# Patient Record
Sex: MALE | Race: WHITE | NOT HISPANIC OR LATINO | Employment: FULL TIME | URBAN - METROPOLITAN AREA
[De-identification: names, ages, dates, MRNs, and addresses within clinical notes are randomized per-mention and may not be internally consistent; named-entity substitution may affect disease eponyms.]

---

## 2023-10-12 ENCOUNTER — OFFICE VISIT (OUTPATIENT)
Dept: ORTHOPEDIC SURGERY | Facility: CLINIC | Age: 58
End: 2023-10-12
Payer: COMMERCIAL

## 2023-10-12 VITALS — BODY MASS INDEX: 34.72 KG/M2 | WEIGHT: 248 LBS | HEIGHT: 71 IN

## 2023-10-12 DIAGNOSIS — M75.51 BURSITIS OF RIGHT SHOULDER: ICD-10-CM

## 2023-10-12 DIAGNOSIS — S46.111S LABRAL TEAR OF LONG HEAD OF BICEPS TENDON, RIGHT, SEQUELA: Primary | ICD-10-CM

## 2023-10-12 DIAGNOSIS — M75.41 IMPINGEMENT SYNDROME OF RIGHT SHOULDER: ICD-10-CM

## 2023-10-12 DIAGNOSIS — M25.511 RIGHT SHOULDER PAIN, UNSPECIFIED CHRONICITY: ICD-10-CM

## 2023-10-12 DIAGNOSIS — S46.001A ROTATOR CUFF INJURY, RIGHT, INITIAL ENCOUNTER: ICD-10-CM

## 2023-10-12 RX ORDER — VALSARTAN 80 MG/1
1 TABLET ORAL DAILY
COMMUNITY

## 2023-10-12 RX ORDER — ATORVASTATIN CALCIUM 20 MG/1
1 TABLET, FILM COATED ORAL DAILY
COMMUNITY
Start: 2023-09-27

## 2023-10-12 NOTE — PROGRESS NOTES
[facility] Orthopaedic Surgery Office Visit - Brody Munguia MD    Office Visit       Patient Name: Otoniel Ball    Chief Complaint:   Chief Complaint   Patient presents with    Right Shoulder - Pain       Referring Physician: Naun Dos Santos*  - I appreciate the referral      History of Present Illness:   Otoniel Ball is a 58 y.o. male who presents with right body part: shoulder Reason: pain.  Onset:Onset: bicep tear 2007 . The issue has been ongoing for 15 year(s). Pain is a 2/10 on the pain scale. Pain is described as Pain Characterization: throbbing. Associated symptoms include Symptoms: pain. The pain is worse with  throwing motion ; nothing improve the pain. Previous treatments have included: Activity modification, self exercise.  I have reviewed the patient's history of present illness as noted/entered above.    I have reviewed the patient's past medical history, surgical history, social history, family history, medications, and allergies as noted in the electronic medical record and as noted/entered.  I have reviewed the patient's review of systems as noted/enter and updated as noted in the patient's HPI.      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    35 years at [employer]    Patient has tried anti-inflammatories, activity modifications, exercise program -- no relief    Enjoys: traveling, hiking, [place] x10 visits          58 y.o. male  Body mass index is 34.72 kg/mý.    Subjective   Subjective      Review of Systems     Past Medical History:   Past Medical History:   Diagnosis Date    Dislocation of finger 1980    Knee sprain 2000    Low back strain  1999    Lumbosacral disc disease 1999    2 rupted discs    Rotator cuff syndrome 2000    Tear of meniscus of knee 2000    Softball       Past Surgical History:   Past Surgical History:   Procedure Laterality Date    HERNIA REPAIR      20 yrs ago - left testicle, belly Button       Family History:   Family History   Problem Relation Age of Onset    No Known Problems Mother     No Known Problems Father     No Known Problems Sister     No Known Problems Brother     No Known Problems Maternal Aunt     No Known Problems Maternal Uncle     No Known Problems Paternal Aunt     No Known Problems Paternal Uncle     No Known Problems Maternal Grandmother     No Known Problems Maternal Grandfather     No Known Problems Paternal Grandmother     No Known Problems Paternal Grandfather     No Known Problems Other        Social History:   Social History     Socioeconomic History    Marital status:    Tobacco Use    Smoking status: Never    Tobacco comments:     Chewed for a few years.   Vaping Use    Vaping Use: Never used   Substance and Sexual Activity    Alcohol use: Yes     Alcohol/week: 2.0 standard drinks of alcohol     Types: 1 Cans of beer, 1 Drinks containing 0.5 oz of alcohol per week    Drug use: Never    Sexual activity: Yes     Partners: Female     Birth control/protection: I.U.D.       Medications:   Current Outpatient Medications:     atorvastatin (LIPITOR) 20 MG tablet, Take 1 tablet by mouth Daily., Disp: , Rfl:     valsartan (DIOVAN) 80 MG tablet, Take 1 tablet by mouth Daily., Disp: , Rfl:     Allergies:   Allergies   Allergen Reactions    Penicillins Rash     CAUSES RASH       The following portions of the patient's history were reviewed and updated as appropriate: allergies, current medications, past family history, past medical history, past social history, past surgical history and problem list.        Objective    Objective      Vital Signs:   Vitals:    10/12/23 1510   Weight: 112 kg (248 lb)  "  Height: 180 cm (70.87\")       Ortho Exam:  General: no acute distress, comfortable  Vitals reviewed in chart    Musculoskeletal Exam    SIDE: Right shoulder  Shoulder Exam:  Tenderness: Chronic long head of the biceps deformity/subtle Vance deformity    Range of motion measurements (degrees)  Forward flexion/Abduction/External rotation at side/ER at 90/IR at 90/IR position  Active: 150/150/60/80/80  Passive: same    Painful arc of motion: yes  No evidence of septic joint  Pain with forward flexion and abduction greater: 90 degrees  Impingement testing Neer's test - positive/painful  Impingement testing Hawkin's test - positive/painful    Rotator Cuff Testing:  Tenderness to palpation at rotator cuff - no  Rotator cuff testing Edmund's test -painful  Rotator cuff testing External rotation - no  Rotator cuff testing Lag signs - no  Rotator cuff testing Belly press - no  Pain with abduction great than 90 degrees - yes    Scapular dyskinesis - present, abnormal scapular motion    Biceps testing (biceps tenderness to palpation, Positive Hurtado's test for biceps, Positive Speed's test, Positive uppercut test): Long head of the biceps has deformity consistent with long head of the biceps rupture-Vance deformity which is fairly subtle but present.  This matches his clinical history.  Some biceps muscle belly pain      Results Review:   Imaging Results (Last 24 Hours)       Procedure Component Value Units Date/Time    XR Shoulder 2+ View Right [[id number]] Resulted: 10/12/23 1525     Updated: 10/12/23 1526    Narrative:      Imaging: shoulder x-rays 2 views - AP and axillary x-ray views    Side: Right shoulder    Indication for shoulder x-ray 2 views: shoulder pain    Comparison: no comparison views available    Findings: No acute bony pathology. No superior humeral head migration.    The humeral head remains centered in the glenohumeral joint. No evidence   of calcific tendonitis.      I personally reviewed the above " x-rays.            Procedures             Assessment / Plan      Assessment/Plan:   Problem List Items Addressed This Visit          Musculoskeletal and Injuries    Right shoulder pain    Relevant Orders    XR Shoulder 2+ View Right (Completed)    MRI Shoulder Right Without Contrast    Bursitis of right shoulder    Relevant Orders    MRI Shoulder Right Without Contrast    Impingement syndrome of right shoulder    Relevant Orders    MRI Shoulder Right Without Contrast       Other    Labral tear of long head of biceps tendon, right, sequela - Primary    Relevant Orders    MRI Shoulder Right Without Contrast    Rotator cuff injury, right, initial encounter    Relevant Orders    MRI Shoulder Right Without Contrast       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  PDF with patient counseling provided.  Concurrent rotator cuff pathology is possible given his dysfunction.  In light of these findings MRI is warranted to further evaluate the right shoulder.    MRI of the shoulder is recommended.  Indication: suspected rotator cuff tear  The MRI is critical to evaluate for rotator cuff tearing and will help with possible surgical planning.      Follow Up: RIGHT SHOULDER MRI --follow-up after MRI completed        Brody Munguia MD, FAAOS  Orthopedic Surgeon  Fellowship Trained Shoulder and Elbow Surgeon  [facility]  Orthopedics and Sports Medicine  [address] [place] [address], [address]  [place] [address]    10/12/23  15:33 EDT

## 2023-11-07 ENCOUNTER — TELEPHONE (OUTPATIENT)
Dept: ORTHOPEDIC SURGERY | Facility: CLINIC | Age: 58
End: 2023-11-07

## 2023-11-07 NOTE — TELEPHONE ENCOUNTER
Caller: LIZET LING    Relationship to patient: SELF    Best call back number: [phone]    Chief complaint:   ORDERED AN MRI OF RIGHT SHOULDER ON 10/12/23. PATIENT SAYS NO ONE HAS CALLED HIM TO SCHEDULE YET. PATIENT ALSO HAS CONCERN ABOUT THE MRI BEING FOR HIS RIGHT SHOULDER ONLY. PATIENT SAYS HE ALSO HAS PAIN AT THE BOTTOM OF HIS BICEP. WORRIED THE SHOULDER MRI WON'T SHOW THAT AREA. PLEASE ADVISE.    Type of visit: MRI

## 2023-11-07 NOTE — TELEPHONE ENCOUNTER
He returned by call.  After looking into the MRI it was ordered 3 weeks ago and requested for it to iman done within one week.  I spoke to Kristy about it and she is working on getting it authorized.    I will call him and let him know.     I called and let him know that Kristy is working on getting his MRI authorized and she will give a call when it is ready to schedule.  Please call him at [phone] if before 5:00pm.  Essence

## 2023-11-20 ENCOUNTER — HOSPITAL ENCOUNTER (OUTPATIENT)
Dept: MRI IMAGING | Facility: HOSPITAL | Age: 58
Discharge: HOME | End: 2023-11-20
Admitting: ORTHOPAEDIC SURGERY
Payer: COMMERCIAL

## 2023-11-20 DIAGNOSIS — M25.511 RIGHT SHOULDER PAIN, UNSPECIFIED CHRONICITY: ICD-10-CM

## 2023-11-20 DIAGNOSIS — S46.111S LABRAL TEAR OF LONG HEAD OF BICEPS TENDON, RIGHT, SEQUELA: ICD-10-CM

## 2023-11-20 DIAGNOSIS — S46.001A ROTATOR CUFF INJURY, RIGHT, INITIAL ENCOUNTER: ICD-10-CM

## 2023-11-20 DIAGNOSIS — M75.51 BURSITIS OF RIGHT SHOULDER: ICD-10-CM

## 2023-11-20 DIAGNOSIS — M75.41 IMPINGEMENT SYNDROME OF RIGHT SHOULDER: ICD-10-CM

## 2023-11-20 PROCEDURE — 73221 MRI JOINT UPR EXTREM W/O DYE: CPT

## 2023-11-28 ENCOUNTER — OFFICE VISIT (OUTPATIENT)
Dept: ORTHOPEDIC SURGERY | Facility: CLINIC | Age: 58
End: 2023-11-28
Payer: COMMERCIAL

## 2023-11-28 VITALS
DIASTOLIC BLOOD PRESSURE: 80 MMHG | SYSTOLIC BLOOD PRESSURE: 122 MMHG | WEIGHT: 252 LBS | HEIGHT: 71 IN | BODY MASS INDEX: 35.28 KG/M2

## 2023-11-28 DIAGNOSIS — M25.511 RIGHT SHOULDER PAIN, UNSPECIFIED CHRONICITY: ICD-10-CM

## 2023-11-28 DIAGNOSIS — M75.41 IMPINGEMENT SYNDROME OF RIGHT SHOULDER: ICD-10-CM

## 2023-11-28 DIAGNOSIS — M75.21 BICEPS TENDINITIS OF RIGHT UPPER EXTREMITY: ICD-10-CM

## 2023-11-28 DIAGNOSIS — M75.111 NONTRAUMATIC INCOMPLETE TEAR OF RIGHT ROTATOR CUFF: Primary | ICD-10-CM

## 2023-11-28 DIAGNOSIS — M75.51 BURSITIS OF RIGHT SHOULDER: ICD-10-CM

## 2023-11-28 DIAGNOSIS — S46.111S LABRAL TEAR OF LONG HEAD OF BICEPS TENDON, RIGHT, SEQUELA: ICD-10-CM

## 2023-11-28 DIAGNOSIS — S46.001A ROTATOR CUFF INJURY, RIGHT, INITIAL ENCOUNTER: ICD-10-CM

## 2023-11-28 NOTE — PROGRESS NOTES
[facility] Orthopaedic Surgery Office Follow Up       Office Follow Up Visit       Patient Name: Otoniel Ball    Chief Complaint:   Chief Complaint   Patient presents with    Follow-up     6 week (MRI) follow-up: Labral tear of long head of biceps tendon, right, sequela; Rotator cuff injury, right       Referring Physician: No ref. provider found    History of Present Illness:   It has been 6  week(s) since Otoniel Ball's last visit. Otoniel Ball returns to clinic today for F/U: follow-up of rightBody Part: shoulderReason: pain. The issue has been ongoing for 10 year(s). Otoniel Ball rates HIS/HER: hispain at 2/10 on the pain scale. Previous/current treatments: nothing. Current symptoms:Symptoms: same as prior visit. The pain is worse with working and lying on affected side; nothing improves the pain. Overall, he/she: heis doing the same.  I have reviewed the patient's history of present illness as noted/entered above.    I have reviewed the patient's past medical history, surgical history, social history, family history, medications, and allergies as noted in the electronic medical record and as noted/entered.  I have reviewed the patient's review of systems as noted/enter and updated as noted in the patient's HPI.    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     35 years at [employer]     Patient has tried anti-inflammatories, activity modifications, exercise program -- no relief     Enjoys: traveling, hiking, [place] x10 visits              58 y.o. male  Body mass index is 34.72 kg/m².      11/28/2023:  Left shoulder  Feels about the same  MRI right shoulder  "completed  Does report some numbness and tingling    Trouble overhead -- throwing ball, darts    LHB partial tearing, partial rotator cuff tearing with partial/full thickness extension  Pain overhead    Questionable possible chronic distal biceps tear? -- bruising at the time and \"6 inch tear\" but appears attached on exam --no obvious full-thickness tearing on clinical exam distally and this was chronic he thinks maybe 15 to 20 years ago.  Good distal strength      Subjective   Subjective      Review of Systems   Constitutional: Negative.  Negative for chills, fatigue and fever.   HENT: Negative.  Negative for congestion and dental problem.    Eyes: Negative.  Negative for blurred vision.   Respiratory: Negative.  Negative for shortness of breath.    Cardiovascular: Negative.  Negative for leg swelling.   Gastrointestinal: Negative.  Negative for abdominal pain.   Endocrine: Negative.  Negative for polyuria.   Genitourinary: Negative.  Negative for difficulty urinating.   Musculoskeletal:  Positive for arthralgias.   Skin: Negative.    Allergic/Immunologic: Negative.    Neurological: Negative.    Hematological: Negative.  Negative for adenopathy.   Psychiatric/Behavioral: Negative.  Negative for behavioral problems.         Past Medical History:   Past Medical History:   Diagnosis Date    Dislocation of finger 1980    Knee sprain 2000    Low back strain 1999    Lumbosacral disc disease 1999    2 rupted discs    Rotator cuff syndrome 2000    Tear of meniscus of knee 2000    Softball       Past Surgical History:   Past Surgical History:   Procedure Laterality Date    HERNIA REPAIR      20 yrs ago - left testicle, belly Button       Family History:   Family History   Problem Relation Age of Onset    No Known Problems Mother     No Known Problems Father     No Known Problems Sister     No Known Problems Brother     No Known Problems Maternal Aunt     No Known Problems Maternal Uncle     No Known Problems Paternal Aunt  " "   No Known Problems Paternal Uncle     No Known Problems Maternal Grandmother     No Known Problems Maternal Grandfather     No Known Problems Paternal Grandmother     No Known Problems Paternal Grandfather     No Known Problems Other        Social History:   Social History     Socioeconomic History    Marital status:    Tobacco Use    Smoking status: Never    Tobacco comments:     Chewed for a few years.   Vaping Use    Vaping Use: Never used   Substance and Sexual Activity    Alcohol use: Yes     Alcohol/week: 2.0 standard drinks of alcohol     Types: 1 Cans of beer, 1 Drinks containing 0.5 oz of alcohol per week    Drug use: Never    Sexual activity: Yes     Partners: Female     Birth control/protection: I.U.D.       Medications:   Current Outpatient Medications:     atorvastatin (LIPITOR) 20 MG tablet, Take 1 tablet by mouth Daily., Disp: , Rfl:     valsartan (DIOVAN) 80 MG tablet, Take 1 tablet by mouth Daily., Disp: , Rfl:     Allergies:   Allergies   Allergen Reactions    Penicillins Rash     CAUSES RASH       The following portions of the patient's history were reviewed and updated as appropriate: allergies, current medications, past family history, past medical history, past social history, past surgical history and problem list.        Objective    Objective      Vital Signs:   Vitals:    11/28/23 1510   BP: 122/80   Weight: 114 kg (252 lb)   Height: 180 cm (70.87\")       Ortho Exam:  RIGHT SHOULDER  Musculoskeletal Exam     SIDE: Right shoulder  Shoulder Exam:  Tenderness: Anterior long head of the biceps pain and proximal muscle belly pain     Range of motion measurements (degrees)  Forward flexion/Abduction/External rotation at side/ER at 90/IR at 90/IR position  Active: 140/140/60/80/80  Passive: same     Painful arc of motion: yes  No evidence of septic joint  Pain with forward flexion and abduction greater: 90 degrees  Impingement testing Neer's test - positive/painful  Impingement testing " Hawkin's test - positive/painful     Rotator Cuff Testing:  Tenderness to palpation at rotator cuff - no  Rotator cuff testing Edmund's test -painful, positive  Well-built  Rotator cuff testing External rotation - no  Rotator cuff testing Lag signs - no  Rotator cuff testing Belly press - no  Pain with abduction great than 90 degrees - yes     Scapular dyskinesis - present, abnormal scapular motion     Biceps testing (biceps tenderness to palpation, Positive Hurtado's test for biceps, Positive Speed's test, Positive uppercut test): a major pain generator    Good distal strength regarding the biceps.    Results Review:  Imaging Results (Last 24 Hours)       ** No results found for the last 24 hours. **            MRI Shoulder Right Without Contrast    Result Date: 11/21/2023  1.Small high-grade partial-thickness intrasubstance tear distal supraspinatus tendon without muscle atrophy or edema. 2.Moderate distal tendinosis with a small partial-thickness intrasubstance tear. 3.Mild distal tendinosis subscapularis tendon. 4.Nonspecific mild to moderate atrophy teres minor muscle. 5.Mild glenohumeral joint arthritis with diffuse labral tearing. 6.Partial-thickness interstitial tearing of the proximal long head biceps tendon. 7.Moderate arthritis acromioclavicular joint. Electronically Signed: Mariya Sanchez MD  11/21/2023 3:19 PM EST  Workstation ID: [id number]      I personally reviewed the imaging above.  Partial rotator cuff tearing with suspected full thickness extension supraspinatus  Partial tear infraspinatus  AC joint OA -- no pain on exam  Degenerative labral tearing  Partial LHB tearing    Procedures            Assessment / Plan      Assessment/Plan:   Problem List Items Addressed This Visit          Musculoskeletal and Injuries    Right shoulder pain    Relevant Orders    External Facility Surgical/Procedural Request    Bursitis of right shoulder    Relevant Orders    External Facility Surgical/Procedural Request     "Impingement syndrome of right shoulder    Relevant Orders    External Facility Surgical/Procedural Request    Nontraumatic incomplete tear of right rotator cuff - Primary    Relevant Orders    External Facility Surgical/Procedural Request    Biceps tendinitis of right upper extremity    Relevant Orders    External Facility Surgical/Procedural Request       Other    Labral tear of long head of biceps tendon, right, sequela    Relevant Orders    External Facility Surgical/Procedural Request    Rotator cuff injury, right, initial encounter    Relevant Orders    External Facility Surgical/Procedural Request       RIGHT SHOULDER  Risks and benefits of continued nonoperative management versus surgical management were discussed. The patient desires to proceed with operative intervention.    Rotator cuff repair with possible biceps tenodesis and subacromial decompression with acromioplasty as indicated. Sometimes the rotator cuff tear is not repairable and may require debridement or partial repair. The procedure is routinely done arthroscopically, but sometimes a larger incision needs to be made to complete the repair in an \"open\" fashion for rare cases.    Specific risks include pain, bleeding, infection, injury to surrounding nerve and blood vessels, fracture, failure or lack of healing of rotator cuff repair, incomplete pain relief, hardware failure, potential need for additional procedures, stiffness after surgery, possible biceps deformity, and potential inability to restore range of motion and strength. Medical, anesthetic, and block complications are possible.    General anesthesia is required, sling compliance, and compliance with physical therapy and/or a surgeon guided exercise program will be very important to the recovery process.    Opioid medications are utilized for a short course after surgery for pain control.     RIGHT SHOULDER  Rotator cuff tear - Arthroscopic rotator cuff repair CPT code 85084  Biceps " tendonitis - Arthroscopic biceps tenodesis CPT code 56602  Shoulder impingement syndrome    He is targeting surgery early part 2024    [facility]    Discussed the long head of the biceps findings as opposed to distal biceps findings-perhaps chronic issue there but no obvious retraction on clinical exam distally and good strength distally.      Ultrasling III - a neutral rotation sling is recommended for this patient for perioperative care.  The patient was fitted for the sling and the sling was provided today.  The sling will be a critical part of the perioperative care for these diagnoses.      The patient desires to proceed with RIGHT shoulder surgery.      Follow Up: RIGHT SHOULDER      Brody Munguia MD, FAAOS  Orthopedic Surgeon  Fellowship Trained Shoulder and Elbow Surgeon  [facility]  Orthopedics and Sports Medicine  [address], [address]  [place] [address]    11/28/23  15:56 EST

## 2024-03-11 ENCOUNTER — TELEPHONE (OUTPATIENT)
Dept: ORTHOPEDIC SURGERY | Facility: CLINIC | Age: 59
End: 2024-03-11
Payer: COMMERCIAL

## 2024-03-11 NOTE — TELEPHONE ENCOUNTER
I called the patient and addressed his questions . I let him know to expect 4-8 weeks off work . I let him know that he could possibly return in 3-4 weeks with light duty and that he would be on a lifting restriction for 3 months. I reiterated to him this all depends on his recovery . As far as the biceps goes . I let him know that they wouldn't know if he needs it till they get in there and open him up . He verbalized understanding and appreciation

## 2024-03-11 NOTE — TELEPHONE ENCOUNTER
Caller: LIZET LING    Phone Number: [phone]     Reason for Call: PATIENT CALLED IN WANTING TO KNOW HOW MUCH TIME DOES HE NEED TO PLAN TO BE OFF OF WORK AFTER SURGERY. PATIENT IS ALSO WANTING TO CHECK WHEN HE GET HIS ROTOR CUFF SURGERY IF DR. PAUL WILL BE OPERATING ON PATIENTS BICEP AS WELL.

## 2024-04-08 ENCOUNTER — OUTSIDE FACILITY SERVICE (OUTPATIENT)
Dept: ORTHOPEDIC SURGERY | Facility: CLINIC | Age: 59
End: 2024-04-08
Payer: COMMERCIAL

## 2024-04-08 ENCOUNTER — DOCUMENTATION (OUTPATIENT)
Dept: ORTHOPEDIC SURGERY | Facility: CLINIC | Age: 59
End: 2024-04-08

## 2024-04-08 DIAGNOSIS — M75.111 NONTRAUMATIC INCOMPLETE TEAR OF RIGHT ROTATOR CUFF: ICD-10-CM

## 2024-04-08 DIAGNOSIS — S46.111S LABRAL TEAR OF LONG HEAD OF BICEPS TENDON, RIGHT, SEQUELA: ICD-10-CM

## 2024-04-08 DIAGNOSIS — M75.51 BURSITIS OF RIGHT SHOULDER: ICD-10-CM

## 2024-04-08 DIAGNOSIS — Z98.890 STATUS POST RIGHT ROTATOR CUFF REPAIR: Primary | ICD-10-CM

## 2024-04-08 DIAGNOSIS — M75.41 IMPINGEMENT SYNDROME OF RIGHT SHOULDER: ICD-10-CM

## 2024-04-08 DIAGNOSIS — S46.001A ROTATOR CUFF INJURY, RIGHT, INITIAL ENCOUNTER: ICD-10-CM

## 2024-04-08 RX ORDER — HYDROCODONE BITARTRATE AND ACETAMINOPHEN 10; 325 MG/1; MG/1
1 TABLET ORAL EVERY 6 HOURS PRN
Qty: 28 TABLET | Refills: 0 | Status: SHIPPED | OUTPATIENT
Start: 2024-04-08 | End: 2024-04-15

## 2024-04-08 RX ORDER — METHOCARBAMOL 500 MG/1
500 TABLET, FILM COATED ORAL 3 TIMES DAILY PRN
Qty: 40 TABLET | Refills: 1 | Status: SHIPPED | OUTPATIENT
Start: 2024-04-08 | End: 2024-04-22

## 2024-04-08 RX ORDER — ONDANSETRON 4 MG/1
4 TABLET, FILM COATED ORAL EVERY 6 HOURS PRN
Qty: 30 TABLET | Refills: 1 | Status: SHIPPED | OUTPATIENT
Start: 2024-04-08 | End: 2024-04-16

## 2024-04-08 NOTE — PROGRESS NOTES
Operative Report     Side/SHOULDER: Right shoulder    LOCATION:   [facility]  [address] [place] [address]    [facility] OPERATIVE REPORT       Surgeon: Brody Munguia MD     Assistant: DEEPTHI Cook     The skilled assistance of the above noted first assistant was necessary during this complex surgical procedure.  The surgical assistant assisted with every aspect of the operation including, but not limited to, proper and safe positioning of the patient, obtaining adequate surgical exposure, manipulation of surgical instruments, suture management, surgical knot tying when necessary, the continual process of hemostasis during the procedure itself in addition to surgical wound closure and removal of the patient from the operating table and returning the patient back to the [facility].  The assistance of the surgical assistant allowed me to perform the most sensitive and technical potions of this operation using 2 hands, thus enhancing efficiency and patient safety.  This would not be possible without the help of a skilled assistant familiar with the procedure and capable of safely performing the aforementioned tasks.     Date of Surgery: 4/8/2024  Shoulder: Right shoulder   Preoperative Diagnosis:  1.     Rotator cuff full-thickness tear - treated with arthroscopic rotator cuff repair - CPT 44518  2.     Biceps tendinopathy and synovitis, degenerative labral tearing, small partial fraying long head of the biceps tendon- treated with arthroscopic biceps tenodesis - CPT 14082  3.     Shoulder impingement syndrome, CA ligament partial tearing- treated with arthroscopic subacromial decompression with acromioplasty - CPT 82953     Postoperative Diagnosis:  1.     SAME as preoperative diagnoses     Procedure:  1.     Arthroscopic rotator cuff repair (1x1) - CPT 14151  2.     Arthroscopic biceps tenodesis (8-8) CPT code 32174  3.     Arthroscopic subacromial  decompression with acromioplasty - CPT 90596        Admission status: elective outpatient surgery     COVID-19 Statement: The patient understands that the surgery is elective surgery.  Patient is aware of the ongoing COVID-19 pandemic and potential implications.    Complications: None     EBL: 10mL     Specimen: NONE     Anesthesia: general anesthesia     Block: regional interscalene block with single shot per anesthesia     Antibiotics: weight based IV antibiotics infused prior to incision     Time out: Time out was called and the patient, side, site, and intended procedures were confirmed.     Counts: Needle and sponge counts were correct prior to closure.     DVT prophylaxis: The patient will be weight bearing as tolerated on bilateral lower extremities postoperatively with no additional chemical DVT prophylaxis indicated.     Marked: The patient was marked with an indelible marker in the preoperative holding area confirming the correct side and site.     History & Physical:   A history and physical examination was completed and updated in the preoperative holding area.     Consent: The patient signed the consent form for surgery with an understanding of the risks and benefits as outlined in the clinic and in the preoperative holding area.     Risks and Benefits: Specific risks and benefits discussed included - pain, bleeding, infection, injury to nerves or blood vessels, fracture, stiffness, failure to heal, revision surgery, deformity of biceps or asymmetry, complications of the block, anesthetic complications, and medical complications associated with surgery.       Indications for surgery:  The patient has history, physical examination, and radiographic findings confirming the diagnoses.  The patient has persistent pain and functional loss unresponsive to non-operative treatment consisting of selective rest/activity modification, medications, and exercises.  MRI documented the status of the rotator cuff -  rotator cuff tearing was noted.     Impingement syndrome - the patient had history and clinical exam findings consistent with shoulder impingement syndrome.  Additionally, the patient had subacromial bursitis which was encountered during the arthroscopic shoulder procedure.  Subacromial decompression with minimal acromioplasty was indicated as part of the treatment of impingement syndrome, and additionally to enhance arthroscopic visualization to enable minimally invasive, arthroscopic rotator cuff repair.  Partial CA ligament tearing was noted and was treated as part of the subacromial decompression    Patient had reported history of biceps tearing at some point he felt that it was more proximal tearing no obvious evidence of distal tearing.  His biceps was noted to be intact on MRI but labral tearing was noted and partial fraying was noted on MRI and at the time of surgery.     Operative Findings:  Rotator Cuff Tissue Quality: Partial tearing with full-thickness extension     Status of the Rotator Cuff:  Supraspinatus -full-thickness tearing 8 to 9 mm     Size of Rotator Cuff Tear:  Supraspinatus -8 to 9 mm     Rotator Cuff Repair Construct:  1x1 Transosseous Equivalent Double Row Arthroscopic Rotator Cuff Repair      Rotator Cuff Implants:  Medial Row: 1 Medial [place] - double loaded [employer] 4.5mm Healicoil PEEK    Lateral Row: 1 Lateral [place] - [employer] 5.5mm Footprint PEEK      Bone Quality: Solid bone quality     Labrum: Degenerative labral tearing    Biceps: tendinopathy and synovitis, degenerative labral tearing, small partial fraying     Biceps Tenodesis Construct:  Suprapectoral biceps tenodesis in the bicipital groove     Biceps Tendon Implant:  Arthrex SwiveLock Biceps Tenodesis BioComposite screw  Drill Size: 8mm  Screw Size: 8mm     Humeral Head: Negative  Glenoid: Degenerative labral tearing noted     Contracture: Negative but did have some baseline maceration under the  armpit.  Pathological laxity: Negative     Procedure in detail:  Regional interscalene block was completed in the preoperative area by the anesthesia team.  The patient was supine on the operative table and the anesthesia team initiated general anesthesia.  The patient was placed in a modified beach chair position with the neck secured in neutral alignment and all bony prominences were well padded.     The shoulder was assessed with an examination under anesthesia.     The operative extremity was cleaned with alcohol and then the extremity was prepped and draped in the standard fashion.  The surgical arm was placed into a well-padded arm ng. A standard posterior portal was created with an incision made 1 cm inferior 1 cm medial to the posterolateral acromial corner.  A blunt metal trocar and cannula were inserted into the glenohumeral joint.  The arthroscopic pump was connected and the above findings and diagnoses were noted as part of the diagnostic shoulder arthroscopy.       A spinal needle was used to localize the anterior portal position in the rotator interval. A 5.5mm plastic cannula and trocar were inserted followed by a 4.5mm shaver/cautery device.  The shaver was used for debridement in the glenohumeral joint.  Arthroscopic scissors were used to perform biceps tenotomy of the pathologic biceps tendon prior to subsequent biceps tenodesis.  The remaining intra-articular portion of the biceps tendon was debrided using the shaver/cautery device.  I did johnson the area of rotator cuff tearing with a spinal needle while within the joint and identified this in the subacromial space-full-thickness tearing was noted.     The arthroscope and metal cannula were then removed in order to transition to the subacromial space.  The blunt metal trocar and cannula were inserted into the subacromial space and the lateral portal site identified with a spinal needle.  An 8 mm plastic cannula and trocar were inserted.  I  turned my attention to the arthroscopic subacromial decompression with acromioplasty. Bursitis was noted in the subacromial space and impacted visualization of the rotator cuff.  The 4.5mm shaver/cautery device was used to clear the subacromial space until the acromion could be identified and bursal resection was completed to allow rotator cuff visualization.  The shaver was used to remove fibrous tissue from the acromial undersurface until the anterolateral and anterior medial corners of the acromion were clearly identified.  The coracoacromial ligament was not released but partial CA ligament tearing was noted and debrided as part of the subacromial decompression.  The shaver was used to perform a minimal acromioplasty.  The shaver/cautery device was used to perform the subacromial decompression with minimal acromioplasty.    I turned my attention to the arthroscopic biceps tenodesis. The arm was placed in a slightly external rotator position and the elbow flexed and shoulder forward flexed into a biceps tendon repair position.  The biceps tendon repair position in achieved in order to try to maintain proper biceps tendon length-tension relationship during the repair.  The biceps sheath was opened using the shaver/cautery device and the pathologic biceps tendon was visualized.  The appropriately sized drill bit was used to create a drill hole for the tendon above the pectoralis major tendon and within the bicipital groove.  The biceps tendon was placed into the drill hole and the screw inserted and tested.  The arthroscopic biceps tenodesis was completed and the repair was noted to be dynamically stable with a solid repair.      I turned my attention to the rotator cuff tear and the arthroscopic rotator cuff repair.  The torn rotator cuff tendon was grasped with a handheld grasper and assessed for mobility and integrity.  The soft tissue at the greater tuberosity insertion site was removed and a power shaver was  used to create a healthy bleeding bed to enhance rotator cuff tendon healing.     Arthroscopic rotator cuff suture anchors were then inserted for the rotator cuff repair.  The single medial row arthroscopic rotator cuff repair anchor was inserted and the sutures from the anchors were withdrawn through the anterior cannula. An arthroscopic suture passer was used to place the high strength sutures through the rotator cuff tendon in an inverted mattress fashion. The sutures were then inserted laterally into 1 lateral knotless anchor with appropriate tensioning.  The completed arthroscopic rotator cuff repair was inspected dynamically and the arthroscopic instruments removed along with the arthroscopic fluid. The incisions were closed with nylon suture and steri-strips were placed over the incisions.  A sterile dressing was applied followed by a neutral rotation sling.  The patient tolerated the procedure well and was transported to the recovery room in satisfactory condition.          Rotator Cuff Repair - POSTOPERATIVE PLAN:  Follow-up in the office in 2 weeks with 1 view of the operative shoulder at that time  Sutures: Nylon sutures to come out in 2 weeks  DVT prophylaxis: No additional chemical DVT prophylaxis   Weightbearing: Nonweightbearing on operative extremity, no biceps loading, pendulums/elbow/wrist/hand motion encouraged  Neutral rotation sling for 3 to 4 weeks following the surgery  Physical therapy: Formal outpatient physical therapy will be provided at the 2-week appointment in the office.  Rotator cuff repair protocol    Electronically signed by Brody Munguia MD, 04/08/24, 8:11 AM EDT.

## 2024-04-25 ENCOUNTER — OFFICE VISIT (OUTPATIENT)
Dept: ORTHOPEDIC SURGERY | Facility: CLINIC | Age: 59
End: 2024-04-25
Payer: COMMERCIAL

## 2024-04-25 VITALS — TEMPERATURE: 97.3 F

## 2024-04-25 DIAGNOSIS — S46.001A ROTATOR CUFF INJURY, RIGHT, INITIAL ENCOUNTER: ICD-10-CM

## 2024-04-25 DIAGNOSIS — M75.51 BURSITIS OF RIGHT SHOULDER: ICD-10-CM

## 2024-04-25 DIAGNOSIS — S46.111S LABRAL TEAR OF LONG HEAD OF BICEPS TENDON, RIGHT, SEQUELA: ICD-10-CM

## 2024-04-25 DIAGNOSIS — M75.111 NONTRAUMATIC INCOMPLETE TEAR OF RIGHT ROTATOR CUFF: ICD-10-CM

## 2024-04-25 DIAGNOSIS — Z98.890 STATUS POST RIGHT ROTATOR CUFF REPAIR: Primary | ICD-10-CM

## 2024-04-25 NOTE — PROGRESS NOTES
[facility] Orthopaedic Surgery Office Follow Up       Office Follow Up Visit       Patient Name: Otoniel Ball    Chief Complaint:   Chief Complaint   Patient presents with   • Post-op     2 week S/P Arthroscopic rotator cuff repair, Biceps tenodesis, Subacromial decompression with acromioplasty - Right (4/8/24)       Referring Physician: No ref. provider found    History of Present Illness:   It has been 2  week(s) since Otoniel Ball's last visit. Otoniel Ball returns to clinic today for F/U: postoperative follow-up of rightBody Part: shoulderReason: arthroscopy. The issue has been ongoing for 2 week(s) (DOS: 4/8/24). Otoniel Ball rates HIS/HER: hispain at 1/10 on the pain scale. Previous/current treatments: bracing and NSAIDS. Current symptoms:Symptoms: pain and stiffness. The pain is worse with sleeping, lying on affected side, and any movement of the joint; resting, ice, and pain medication and/or NSAID improves the pain. Overall, he/she: heis doing better. I have reviewed the patient's history of present illness as noted/entered above.    I have reviewed the patient's past medical history, surgical history, social history, family history, medications, and allergies as noted in the electronic medical record and as noted/entered.  I have reviewed the patient's review of systems as noted/enter and updated as noted in the patient's HPI.      Date of Surgery: 4/8/2024  Shoulder: Right shoulder   Preoperative Diagnosis:  1.     Rotator cuff full-thickness tear - treated with arthroscopic rotator cuff repair - CPT 89395  2.     Biceps tendinopathy and synovitis, degenerative labral tearing, small partial fraying long head of the biceps tendon- treated with arthroscopic biceps tenodesis - CPT 69541  3.     Shoulder impingement syndrome, CA ligament partial tearing- treated with arthroscopic subacromial decompression with acromioplasty - CPT 90511    "  Postoperative Diagnosis:  1.     SAME as preoperative diagnoses     Procedure:  1.     Arthroscopic rotator cuff repair (1x1) - CPT 72918  2.     Arthroscopic biceps tenodesis (8-8) CPT code 60691  3.     Arthroscopic subacromial decompression with acromioplasty - CPT 14780        Admission status: elective outpatient surgery    Right shoulder  Long head of the biceps tear 2007, nonsurgical treatment at the time was recommended  Overall has done fairly well but has trouble throwing a ball football or shooting a basketball with the right arm  [employer] -- staffing     Worsening pain of late but has been present since his original injury.     35 years at [employer]     Patient has tried anti-inflammatories, activity modifications, exercise program -- no relief     Enjoys: traveling, hiking, [place] x10 visits              58 y.o. male  Body mass index is 34.72 kg/m².        11/28/2023:  Left shoulder  Feels about the same  MRI right shoulder completed  Does report some numbness and tingling     Trouble overhead -- throwing ball, darts     LHB partial tearing, partial rotator cuff tearing with partial/full thickness extension  Pain overhead     Questionable possible chronic distal biceps tear? -- bruising at the time and \"6 inch tear\" but appears attached on exam --no obvious full-thickness tearing on clinical exam distally and this was chronic he thinks maybe 15 to 20 years ago.  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  Negative for chills, fatigue and fever.   HENT: Negative.  Negative for congestion and dental problem.    Eyes: Negative.  Negative for blurred vision.   Respiratory: Negative.  Negative for shortness of breath.    Cardiovascular: Negative.  Negative for leg swelling.   Gastrointestinal: Negative. "  Negative for abdominal pain.   Endocrine: Negative.  Negative for polyuria.   Genitourinary: Negative.  Negative for difficulty urinating.   Musculoskeletal:  Positive for arthralgias.   Skin: Negative.    Allergic/Immunologic: Negative.    Neurological: Negative.    Hematological: Negative.  Negative for adenopathy.   Psychiatric/Behavioral: Negative.  Negative for behavioral problems.         Past Medical History:   Past Medical History:   Diagnosis Date   • Dislocation of finger 1980   • Knee sprain 2000   • Low back strain 1999   • Lumbosacral disc disease 1999 2 rupted discs   • Rotator cuff syndrome 2000   • Tear of meniscus of knee 2000    Softball       Past Surgical History:   Past Surgical History:   Procedure Laterality Date   • HERNIA REPAIR      20 yrs ago - left testicle, belly Button   • SHOULDER SURGERY Right 04/08/2024    Right RCR - Dr. Brody Munguia       Family History:   Family History   Problem Relation Age of Onset   • No Known Problems Mother    • No Known Problems Father    • No Known Problems Sister    • No Known Problems Brother    • No Known Problems Maternal Aunt    • No Known Problems Maternal Uncle    • No Known Problems Paternal Aunt    • No Known Problems Paternal Uncle    • No Known Problems Maternal Grandmother    • No Known Problems Maternal Grandfather    • No Known Problems Paternal Grandmother    • No Known Problems Paternal Grandfather    • No Known Problems Other        Social History:   Social History     Socioeconomic History   • Marital status:    Tobacco Use   • Smoking status: Never     Passive exposure: Never   • Smokeless tobacco: Former     Types: Chew   • Tobacco comments:     Chewed for a few years.   Vaping Use   • Vaping status: Never Used   Substance and Sexual Activity   • Alcohol use: Yes     Alcohol/week: 2.0 standard drinks of alcohol     Types: 1 Cans of beer, 1 Drinks containing 0.5 oz of alcohol per week   • Drug use: Never   • Sexual activity:  Yes     Partners: Female     Birth control/protection: I.U.D.       Medications:   Current Outpatient Medications:   •  atorvastatin (LIPITOR) 20 MG tablet, Take 1 tablet by mouth Daily., Disp: , Rfl:   •  valsartan (DIOVAN) 80 MG tablet, Take 1 tablet by mouth Daily., Disp: , Rfl:     Allergies:   Allergies   Allergen Reactions   • Penicillins Rash     CAUSES RASH       The following portions of the patient's history were reviewed and updated as appropriate: allergies, current medications, past family history, past medical history, past social history, past surgical history and problem list.        Objective    Objective      Vital Signs:   Vitals:    04/25/24 0906   Temp: 97.3 °F (36.3 °C)       Ortho Exam:  General: comfortable  Vascular: well perfused  Neurologic: sensation to light touch is intact distally, elbow flexion/elbow extension/wrist flexion/wrist extension/hand intrinsics intact, able to fire deltoid; no rsutures were removed, surgical incisions are well appearing, with no drainage or surrounding erythema; no signs or symptoms of infection or DVT  Biceps: no deformity noted      Results Review:  Imaging Results (Last 24 Hours)       Procedure Component Value Units Date/Time    XR Shoulder 1 View Right [[id number]] Resulted: 04/25/24 0929     Updated: 04/25/24 0929    Narrative:      Imaging: shoulder x-rays 1 view - AP x-ray views    Side: RIGHT SHOULDER    Indication for shoulder x-ray 1 view: shoulder pain, postop evaluation   following surgery    Comparison: the current xray was compared to preoperative imaging and   indicates expected postoperative changes.    Findings: No acute bony pathology. Well appearing and well positioned   compared to preoperative imaging.  Located and no fracture noted.    I personally reviewed the above x-rays.              Procedures            Assessment / Plan      Assessment/Plan:   Problem List Items Addressed This Visit          Musculoskeletal and Injuries     Bursitis of right shoulder    Relevant Orders    Ambulatory Referral to Physical Therapy Evaluate and treat, Ortho (Completed)    Nontraumatic incomplete tear of right rotator cuff    Relevant Orders    Ambulatory Referral to Physical Therapy Evaluate and treat, Ortho (Completed)    Status post right rotator cuff repair - Primary    Relevant Orders    XR Shoulder 1 View Right (Completed)    Ambulatory Referral to Physical Therapy Evaluate and treat, Ortho (Completed)       Other    Labral tear of long head of biceps tendon, right, sequela    Relevant Orders    Ambulatory Referral to Physical Therapy Evaluate and treat, Ortho (Completed)    Rotator cuff injury, right, initial encounter    Relevant Orders    Ambulatory Referral to Physical Therapy Evaluate and treat, Ortho (Completed)       RIGHT SHOULDER  1. Physical therapy prescription and physical therapy protocol were provided to the patient.  I counseled regarding the importance of participation with physical therapy, sling compliance, non-weight bearing, and no biceps loading.    Sling for 1 to 2 additional weeks  No weight bearing  No biceps loading    2.  Pain control - excellent    3. Follow-up in 2 months [place]      Follow Up: 2 months, [place]  Work Note -- no return until cleared      Brody Munguia MD, FAAOS  Orthopedic Surgeon  Fellowship Trained Shoulder and Elbow Surgeon  [facility]  Orthopedics and Sports Medicine  [address] [place] [address], [address]  [place] [address]    04/25/24  09:42 EDT

## 2024-05-06 ENCOUNTER — TELEPHONE (OUTPATIENT)
Dept: ORTHOPEDIC SURGERY | Facility: CLINIC | Age: 59
End: 2024-05-06
Payer: COMMERCIAL

## 2024-06-03 ENCOUNTER — TELEPHONE (OUTPATIENT)
Dept: ORTHOPEDIC SURGERY | Facility: CLINIC | Age: 59
End: 2024-06-03
Payer: COMMERCIAL

## 2024-06-03 NOTE — TELEPHONE ENCOUNTER
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 THEY WILL ONLY APPROVE HIM OFF 4 WEEKS AT A TIME. PATIENT DOESN'T HAVE HIS FOLLOW U APPOINTMENT UNTIL JULY 2.   DISABILITY CLAIM CASE MANGER: NEERAJ CARREON   FAX NUMBER: [phone]

## 2024-06-03 NOTE — TELEPHONE ENCOUNTER
Spoke to pt to let him know we would need for his employer to fax us over a new form (Physician's Statement) to fill out to update.    He verbalized understanding and will try to get in touch with them to request they do this.    Will let us know if there is anything else he needs from us in the meantime.    Sherrie RAMEY CMA ([facility]), ROT

## 2024-07-02 ENCOUNTER — OFFICE VISIT (OUTPATIENT)
Age: 59
End: 2024-07-02
Payer: COMMERCIAL

## 2024-07-02 DIAGNOSIS — Z98.890 STATUS POST RIGHT ROTATOR CUFF REPAIR: Primary | ICD-10-CM

## 2024-07-02 RX ORDER — FLUTICASONE PROPIONATE 50 MCG
2 SPRAY, SUSPENSION (ML) NASAL DAILY
COMMUNITY
Start: 2024-06-17

## 2024-07-02 RX ORDER — OMEPRAZOLE 20 MG/1
20 CAPSULE, DELAYED RELEASE ORAL
COMMUNITY
Start: 2024-07-01

## 2024-07-02 RX ORDER — TIRZEPATIDE 5 MG/.5ML
1 INJECTION, SOLUTION SUBCUTANEOUS WEEKLY
COMMUNITY
Start: 2024-06-21

## 2024-07-02 RX ORDER — ONDANSETRON 8 MG/1
8 TABLET, ORALLY DISINTEGRATING ORAL
COMMUNITY
Start: 2024-07-01

## 2024-07-02 NOTE — PROGRESS NOTES
[facility] [place] [facility] Follow Up       Office Follow Up Visit       Patient Name: Otoniel Ball    Chief Complaint:   Chief Complaint   Patient presents with    Post-op     9.5 week follow-up--12 weeks status post RIGHT arthroscopic rotator cuff repair, biceps tenodesis and subacromial decompression with acromioplasty; DOS 04/08/2024       Referring Physician: No ref. provider found    History of Present Illness:   Otoniel Ball returns to clinic today for follow-up 12 weeks s/p right arthroscopic rotator cuff repair, biceps tenodesis, subacromial decompression with Dr. Munguia.  Last visit on 4/25/2024.  He reports significant improvements compared to last visit with right shoulder pain and range of motion.  He has been in physical therapy at [facility].    Currently off work on short-term disability.  He works at [email or web address].    Date of Surgery: 4/8/2024  Shoulder: Right shoulder   Preoperative Diagnosis:  1.     Rotator cuff full-thickness tear - treated with arthroscopic rotator cuff repair - CPT 89348  2.     Biceps tendinopathy and synovitis, degenerative labral tearing, small partial fraying long head of the biceps tendon- treated with arthroscopic biceps tenodesis - CPT 88792  3.     Shoulder impingement syndrome, CA ligament partial tearing- treated with arthroscopic subacromial decompression with acromioplasty - CPT 85495     Postoperative Diagnosis:  1.     SAME as preoperative diagnoses     Procedure:  1.     Arthroscopic rotator cuff repair (1x1) - CPT 55039  2.     Arthroscopic biceps tenodesis (8-8) CPT code 30536  3.     Arthroscopic subacromial decompression with acromioplasty - CPT 60856    Subjective     Review of Systems   Constitutional: Negative.  Negative for chills, fatigue and fever.   HENT: Negative.  Negative for congestion and dental problem.    Eyes: Negative.  Negative for blurred vision.   Respiratory:  Negative.  Negative for shortness of breath.    Cardiovascular: Negative.  Negative for leg swelling.   Gastrointestinal: Negative.  Negative for abdominal pain.   Endocrine: Negative.  Negative for polyuria.   Genitourinary: Negative.  Negative for difficulty urinating.   Musculoskeletal:  Positive for arthralgias.   Skin: Negative.    Allergic/Immunologic: Negative.    Neurological: Negative.    Hematological: Negative.  Negative for adenopathy.   Psychiatric/Behavioral: Negative.  Negative for behavioral problems.         I have reviewed and updated the following portions of the patient's history and review of systems: allergies, current medications, past family history, past medical history, past social history, past surgical history and problem list.    Medications:   Current Outpatient Medications:     atorvastatin (LIPITOR) 20 MG tablet, Take 1 tablet by mouth Daily., Disp: , Rfl:     fluticasone (FLONASE) 50 MCG/ACT nasal spray, 2 sprays Daily., Disp: , Rfl:     omeprazole (priLOSEC) 20 MG capsule, 1 capsule., Disp: , Rfl:     ondansetron ODT (ZOFRAN-ODT) 8 MG disintegrating tablet, 1 tablet., Disp: , Rfl:     valsartan (DIOVAN) 80 MG tablet, Take 1 tablet by mouth Daily., Disp: , Rfl:     Zepbound 5 MG/0.5ML solution auto-injector, Inject 1 syringe under the skin into the appropriate area as directed 1 (One) Time Per Week., Disp: , Rfl:     Allergies:   Allergies   Allergen Reactions    Penicillins Rash     CAUSES RASH         Objective      Vital Signs: There were no vitals filed for this visit.    Ortho Exam:  General: no acute distress, comfortable  Vitals reviewed in chart    Musculoskeletal Exam:    SIDE: Right shoulder  Incisions well healed    Range of motion measurements (degrees): 150/140/60/L5  Painful arc of motion: no  Negative lag sign  No evidence of septic joint      Results Review:  XR Shoulder 1 View Right  Imaging: shoulder x-rays 1 view - AP x-ray views    Side: RIGHT  SHOULDER    Indication for shoulder x-ray 1 view: shoulder pain, postop evaluation   following surgery    Comparison: the current xray was compared to preoperative imaging and   indicates expected postoperative changes.    Findings: No acute bony pathology. Well appearing and well positioned   compared to preoperative imaging.  Located and no fracture noted.    I personally reviewed the above x-rays.         Assessment / Plan      Assessment:   Diagnoses and all orders for this visit:    1. Status post right rotator cuff repair (Primary)  -     Ambulatory Referral to Physical Therapy for Evaluation & Treatment        Quality Metrics:   BMI:   Class 2 Severe Obesity (BMI >=35 and <=39.9). Obesity-related health conditions include the following: none.   Tobacco:   Otoinel Ball  reports that he has never smoked. He has never been exposed to tobacco smoke. He has quit using smokeless tobacco.  His smokeless tobacco use included chew.        Plan:  Patient making great progress 3 months s/p right rotator cuff repair with Dr. Munguia.  He will continue with physical therapy. May start gradual strengthening at this time. New order provided today. He will be taking the therapy order to a location in [place] as he will be there for the next couple months. Returning in September.  He will follow-up if needed during his return. He will keep me updated pending any issues or concerns.    Follow Up:   2-3 months      Rimma Meier PA-C  [facility] [place] [facility]    Dictated using Dragon Speech Recognition.

## 2024-09-10 ENCOUNTER — OFFICE VISIT (OUTPATIENT)
Age: 59
End: 2024-09-10
Payer: COMMERCIAL

## 2024-09-10 VITALS
BODY MASS INDEX: 32.68 KG/M2 | HEIGHT: 71 IN | DIASTOLIC BLOOD PRESSURE: 90 MMHG | WEIGHT: 233.4 LBS | SYSTOLIC BLOOD PRESSURE: 132 MMHG

## 2024-09-10 DIAGNOSIS — Z98.890 STATUS POST RIGHT ROTATOR CUFF REPAIR: Primary | ICD-10-CM

## 2024-09-10 NOTE — PROGRESS NOTES
[facility] [place] [facility] Follow Up - Brody Munguia MD  [facility]    Office Follow Up Visit       Patient Name: Otoniel Ball    Chief Complaint:   Chief Complaint   Patient presents with    Follow-up     2 month recheck - status post RIGHT arthroscopic rotator cuff repair, biceps tenodesis and subacromial decompression with acromioplasty - DOS 04/08/2024       Referring Physician: Naun Dos Santos*    History of Present Illness:   It has been 2  month(s) since Otoniel Ball's last visit. Otoniel Ball returns to clinic today for F/U: follow-up of rightBody Part: shoulderReason: arthroscopy. The issue has been ongoing for 5 month(s). Otoniel Ball rates HIS/HER: hispain at       - M:E ratio:  ***/10 on the pain scale. Previous/current treatments: {Previous Tx:82093}. Current symptoms:{Symptoms:37564:::1}. The pain is worse with {Movement:58276}; {Home Tx:22230} improves the pain. Overall, {he/she:82949:::1}is doing {better worse same:40609}. ***    ***      Subjective   Subjective      Review of Systems     Past Medical History:   Past Medical History:   Diagnosis Date    Dislocation of finger 1980    Knee sprain 2000    Low back strain 1999    Lumbosacral disc disease 1999    2 rupted discs    Rotator cuff syndrome 2000    Tear of meniscus of knee 2000    Softball       Past Surgical History:   Past Surgical History:   Procedure Laterality Date    HERNIA REPAIR      20 yrs ago - left testicle, belly Button    SHOULDER SURGERY Right 04/08/2024    Right RCR - Dr. Brody Munguia       Family History:   Family History   Problem Relation Age of Onset    No Known Problems Mother     No Known Problems Father     No Known Problems Sister     No Known Problems Brother     No Known Problems Maternal Aunt     No Known Problems Maternal Uncle     No Known Problems Paternal Aunt     No Known  "Problems Paternal Uncle     No Known Problems Maternal Grandmother     No Known Problems Maternal Grandfather     No Known Problems Paternal Grandmother     No Known Problems Paternal Grandfather     No Known Problems Other        Social History:   Social History     Socioeconomic History    Marital status:    Tobacco Use    Smoking status: Never     Passive exposure: Never    Smokeless tobacco: Former     Types: Chew    Tobacco comments:     Chewed for a few years.   Vaping Use    Vaping status: Never Used   Substance and Sexual Activity    Alcohol use: Yes     Alcohol/week: 2.0 standard drinks of alcohol     Types: 1 Cans of beer, 1 Drinks containing 0.5 oz of alcohol per week    Drug use: Never    Sexual activity: Yes     Partners: Female     Birth control/protection: I.U.D.       Medications:   Current Outpatient Medications:     atorvastatin (LIPITOR) 20 MG tablet, Take 1 tablet by mouth Daily., Disp: , Rfl:     fluticasone (FLONASE) 50 MCG/ACT nasal spray, 2 sprays Daily., Disp: , Rfl:     omeprazole (priLOSEC) 20 MG capsule, 1 capsule., Disp: , Rfl:     ondansetron ODT (ZOFRAN-ODT) 8 MG disintegrating tablet, 1 tablet., Disp: , Rfl:     valsartan (DIOVAN) 80 MG tablet, Take 1 tablet by mouth Daily., Disp: , Rfl:     Allergies:   Allergies   Allergen Reactions    Penicillins Rash     CAUSES RASH       The following portions of the patient's history were reviewed and updated as appropriate: allergies, current medications, past family history, past medical history, past social history, past surgical history and problem list.        Objective    Objective      Vital Signs:   Vitals:    09/10/24 0925   BP: 132/90   Weight: 106 kg (233 lb 6.4 oz)   Height: 180 cm (70.87\")       Ortho Exam:  ***    Results Review:  Imaging Results (Last 24 Hours)       ** No results found for the last 24 hours. **            No Images in the past 120 days found..    ***    Procedures    ***        Assessment / Plan  "     Assessment/Plan:   Problem List Items Addressed This Visit    None      ***    Follow Up: ***      Brody Munguia MD, FAAOS  Orthopedic Surgeon, Shoulder Surgery  [facility]  Orthopedics and Sports Medicine  [address], [address]  [place] [address]    & [facility] Location:  [facility] Location  [address], [address]  [place] [address]    09/10/24  09:26 EDT

## 2024-09-10 NOTE — PROGRESS NOTES
[facility] [place] [facility] Follow Up       Office Follow Up Visit       Patient Name: Otoniel Ball    Chief Complaint:   Chief Complaint   Patient presents with    Follow-up     2 month recheck - status post RIGHT arthroscopic rotator cuff repair, biceps tenodesis and subacromial decompression with acromioplasty - DOS 04/08/2024       Referring Physician: Naun Dos Santos*    History of Present Illness:   Otoniel Ball returns to clinic today for follow-up visit 5 months s/p right rotator cuff repair, biceps tenodesis, subacromial decompression with Dr. Munguia.  Last visit on 7/2/2024.  He continues to see increases in right shoulder motion and strength.  Working on strengthening with [facility].  Overall doing very well.  Great trip to [place] recently.  Getting ready to go back.    Date of Surgery: 4/8/2024  Shoulder: Right shoulder   Preoperative Diagnosis:  1.     Rotator cuff full-thickness tear - treated with arthroscopic rotator cuff repair - CPT 66138  2.     Biceps tendinopathy and synovitis, degenerative labral tearing, small partial fraying long head of the biceps tendon- treated with arthroscopic biceps tenodesis - CPT 92869  3.     Shoulder impingement syndrome, CA ligament partial tearing- treated with arthroscopic subacromial decompression with acromioplasty - CPT 88937     Postoperative Diagnosis:  1.     SAME as preoperative diagnoses     Procedure:  1.     Arthroscopic rotator cuff repair (1x1) - CPT 17593  2.     Arthroscopic biceps tenodesis (8-8) CPT code 08383  3.     Arthroscopic subacromial decompression with acromioplasty - CPT 28074    Subjective     Review of Systems   Constitutional:  Negative for chills, fever, unexpected weight gain and unexpected weight loss.   HENT:  Negative for congestion, postnasal drip and rhinorrhea.    Eyes:  Negative for blurred vision.   Respiratory:  Negative for shortness of  "breath.    Cardiovascular:  Negative for leg swelling.   Gastrointestinal:  Negative for abdominal pain, nausea and vomiting.   Genitourinary:  Negative for difficulty urinating.   Musculoskeletal:  Positive for arthralgias. Negative for gait problem, joint swelling and myalgias.   Skin:  Negative for skin lesions and wound.   Neurological:  Negative for dizziness, weakness, light-headedness and numbness.   Hematological:  Does not bruise/bleed easily.   Psychiatric/Behavioral:  Negative for depressed mood.    All other systems reviewed and are negative.       I have reviewed and updated the following portions of the patient's history and review of systems: allergies, current medications, past family history, past medical history, past social history, past surgical history and problem list.    Medications:   Current Outpatient Medications:     atorvastatin (LIPITOR) 20 MG tablet, Take 1 tablet by mouth Daily., Disp: , Rfl:     fluticasone (FLONASE) 50 MCG/ACT nasal spray, 2 sprays Daily., Disp: , Rfl:     omeprazole (priLOSEC) 20 MG capsule, 1 capsule., Disp: , Rfl:     ondansetron ODT (ZOFRAN-ODT) 8 MG disintegrating tablet, 1 tablet., Disp: , Rfl:     valsartan (DIOVAN) 80 MG tablet, Take 1 tablet by mouth Daily., Disp: , Rfl:     Allergies:   Allergies   Allergen Reactions    Penicillins Rash     CAUSES RASH         Objective      Vital Signs:   Vitals:    09/10/24 0925   BP: 132/90   Weight: 106 kg (233 lb 6.4 oz)   Height: 180 cm (70.87\")       Ortho Exam:  General: no acute distress, comfortable  Vitals reviewed in chart    Musculoskeletal Exam:    SIDE: Right shoulder    Tenderness: No focal tenderness  Incisions well healed    Range of motion measurements (degrees): 150/140/60/40  Painful arc of motion: No  Negative lag sign  No evidence of septic joint      Results Review:  XR Shoulder 1 View Right  Imaging: shoulder x-rays 1 view - AP x-ray views    Side: RIGHT SHOULDER    Indication for shoulder x-ray 1 " view: shoulder pain, postop evaluation   following surgery    Comparison: the current xray was compared to preoperative imaging and   indicates expected postoperative changes.    Findings: No acute bony pathology. Well appearing and well positioned   compared to preoperative imaging.  Located and no fracture noted.    I personally reviewed the above x-rays.       I have noted results reviewed from previous encounter.      Assessment / Plan      Assessment:   Diagnoses and all orders for this visit:    1. Status post right rotator cuff repair (Primary)        Quality Metrics:   BMI:   BMI is >= 30 and <35. (Class 1 Obesity). The following options were offered after discussion;: weight loss educational material (shared in after visit summary)       Tobacco:   Otoniel Ball  reports that he has never smoked. He has never been exposed to tobacco smoke. He has quit using smokeless tobacco.  His smokeless tobacco use included chew.           Plan:  5 months s/p right rotator cuff repair, biceps tenodesis with Dr. Munguia.  Overall doing very well with excellent range of motion and strength on exam.  Encouraged to continue with physical therapy exercises as needed.  He will be traveling back up to being seen.  He will keep us updated pending any issues or concerns and follow-up as needed.      Follow Up:   Return as needed.    Rimma Meier PA-C  [facility] [facility]    Dictated using Dragon Speech Recognition.